# Patient Record
Sex: FEMALE | Race: WHITE | Employment: PART TIME | URBAN - METROPOLITAN AREA
[De-identification: names, ages, dates, MRNs, and addresses within clinical notes are randomized per-mention and may not be internally consistent; named-entity substitution may affect disease eponyms.]

---
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
## 2021-04-30 DIAGNOSIS — F41.9 ANXIETY: ICD-10-CM

## 2021-04-30 RX ORDER — ESCITALOPRAM OXALATE 5 MG/1
TABLET ORAL
Qty: 90 TABLET | Refills: 0 | Status: SHIPPED | OUTPATIENT
Start: 2021-04-30 | End: 2021-05-20 | Stop reason: SDUPTHER

## 2021-05-20 ENCOUNTER — OFFICE VISIT (OUTPATIENT)
Dept: FAMILY MEDICINE CLINIC | Age: 21
End: 2021-05-20
Payer: COMMERCIAL

## 2021-05-20 VITALS
BODY MASS INDEX: 21.53 KG/M2 | SYSTOLIC BLOOD PRESSURE: 112 MMHG | DIASTOLIC BLOOD PRESSURE: 74 MMHG | HEIGHT: 66 IN | HEART RATE: 87 BPM | OXYGEN SATURATION: 99 % | WEIGHT: 134 LBS

## 2021-05-20 DIAGNOSIS — Z00.00 WELL ADULT EXAM: Primary | ICD-10-CM

## 2021-05-20 DIAGNOSIS — F41.9 ANXIETY: ICD-10-CM

## 2021-05-20 PROCEDURE — 99395 PREV VISIT EST AGE 18-39: CPT | Performed by: NURSE PRACTITIONER

## 2021-05-20 RX ORDER — CHOLECALCIFEROL (VITAMIN D3) 125 MCG
500 CAPSULE ORAL DAILY
COMMUNITY

## 2021-05-20 RX ORDER — ESCITALOPRAM OXALATE 5 MG/1
TABLET ORAL
Qty: 90 TABLET | Refills: 1 | Status: SHIPPED | OUTPATIENT
Start: 2021-05-20 | End: 2021-07-02

## 2021-05-20 RX ORDER — MULTIVIT-MIN/IRON/FOLIC ACID/K 18-600-40
1 CAPSULE ORAL DAILY
COMMUNITY

## 2021-05-20 SDOH — ECONOMIC STABILITY: FOOD INSECURITY: WITHIN THE PAST 12 MONTHS, THE FOOD YOU BOUGHT JUST DIDN'T LAST AND YOU DIDN'T HAVE MONEY TO GET MORE.: NEVER TRUE

## 2021-05-20 SDOH — ECONOMIC STABILITY: FOOD INSECURITY: WITHIN THE PAST 12 MONTHS, YOU WORRIED THAT YOUR FOOD WOULD RUN OUT BEFORE YOU GOT MONEY TO BUY MORE.: NEVER TRUE

## 2021-05-20 ASSESSMENT — PATIENT HEALTH QUESTIONNAIRE - PHQ9
2. FEELING DOWN, DEPRESSED OR HOPELESS: 0
SUM OF ALL RESPONSES TO PHQ QUESTIONS 1-9: 0

## 2021-05-20 NOTE — PROGRESS NOTES
History and Physical      Meme Lock  YOB: 2000    Date of Service:  5/20/2021    Chief Complaint:   Leigh Dance is a 24 y.o. female who presents for complete physical examination. Chief Complaint   Patient presents with    Annual Exam     bruises on thigh X 2 weeks, not sure how she got them       HPI: Patient is here for her preventative physical.     Goes to college at [employer] to be a [employer] next year. Lives in an apt at school with a roommate. Has adopted a dog and serves as an emotional support animal.     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 Mood Disorder:  Patient presents for follow-up of anxiety disorder. Current complaints include: anxiety. She denies depressed mood, tearfulness, insomnia, changes in appetite/weight, difficulty concentrating, excessive worry, restlessness, panic attacks and suicidal thoughts or behavior. Symptoms/signs of joseph: none. External stressors: nothing new. Current treatment includes: Lexapro- 5 mg daily. Medication side effects: none. Reports that anxiety better this spring semester. Had more anxiety in the fall semester. Feels that school was smother. Denies depression. Feels that anxiety is controlled on the lexapro 5 mg daily. Worried if she would stop medication that her sleep would decline again. Sleeping well at night. Getting 7-8 hours per sleep. Has brusing on left thigh that has been there for a while. Dog does jump on her. Eats vegan diet. Exercise- 1-2 times per week will do dance exercises. Periods are regular every 28 days. Has some cramping and will need to take an occasional ibuprofen and that works well for her. Wt Readings from Last 3 Encounters:   05/20/21 134 lb (60.8 kg) (60 %, Z= 0.25)*   01/10/20 133 lb (60.3 kg) (64 %, Z= 0.35)*   06/06/19 127 lb (57.6 kg) (56 %, Z= 0.15)*     * Growth percentiles are based on CDC (Girls, 2-20 Years) data. BP Readings from Last 3 Encounters:   05/20/21 112/74   01/10/20 112/68   06/06/19 110/70       Patient Active Problem List   Diagnosis    Generalized hyperhidrosis- followed by tunde Anne Code:  Health Maintenance   Topic Date Due    Hepatitis C screen  Never done    COVID-19 Vaccine (1) Never done    HIV screen  Never done    Chlamydia screen  Never done    DTaP/Tdap/Td vaccine (7 - Td) 07/09/2022    Hepatitis B vaccine  Completed    Hib vaccine  Completed    HPV vaccine  Completed    Varicella vaccine  Completed    Meningococcal (ACWY) vaccine  Completed    Flu vaccine  Completed    Hepatitis A vaccine  Aged Out    Pneumococcal 0-64 years Vaccine  Aged Out        Sexual activity: none   Self-breast exams: yes  Last eye exam: 2018, normal  Exercise: aerobics 2 time(s) per week  Seatbelt use: always   Lipid panel: No results found for: CHOL, TRIG, HDL, LDLCALC, LDLDIRECT     Advance Directive: N, <no information>    Immunization History   Administered Date(s) Administered    DTaP 2001, 2001, 2001, 01/28/2004, 03/29/2006    HPV 9-valent Coralyn Landsberg) 05/02/2017, 05/31/2018, 01/10/2020    Hepatitis B 2001, 2001, 03/13/2002    Hib, unspecified 2001, 2001, 2001, 06/12/2002    Influenza Virus Vaccine 10/18/2018, 10/01/2019, 10/03/2020    Influenza, Quadv, IM, PF (6 mo and older Fluzone, Flulaval, Fluarix, and 3 yrs and older Afluria) 10/12/2019    MMR 06/12/2002, 03/29/2006    Meningococcal MCV4P (Menactra) 05/31/2018    Meningococcal MPSV4 (Menomune) 06/26/2013    Pneumococcal Conjugate 7-valent (Prevnar7) 2001, 2001, 2001    Polio IPV (IPOL) 2001, 2001, 2001, 03/29/2006    Tdap (Boostrix, Adacel) 07/09/2012    Varicella (Varivax) 06/12/2002, 06/16/2015       Allergies   Allergen Reactions    Corn-Containing Products      Vomiting     Pcn [Penicillins] Rash     Outpatient Medications Marked as Taking for the 5/20/21 encounter (Office Visit) with Sarah YaelNICOL CNP   Medication Sig Dispense Refill    escitalopram (LEXAPRO) 5 MG tablet TAKE 1 TABLET BY MOUTH ONE TIME A DAY 90 tablet 0       No past medical history on file. No past surgical history on file. Family History   Problem Relation Age of Onset    Other Mother         lupus    Osteoporosis Maternal Grandmother     Other Maternal Grandmother         depression, demetia     Other Maternal Grandfather         hypothyroid    High Blood Pressure Maternal Grandfather     Breast Cancer Paternal Grandmother     Other Paternal Grandmother         lupus    Substance Abuse Paternal Grandfather     Heart Attack Paternal Grandfather     High Cholesterol Paternal Grandfather     Prostate Cancer Paternal Grandfather      Social History     Socioeconomic History    Marital status: Single     Spouse name: Not on file    Number of children: Not on file    Years of education: Not on file    Highest education level: Not on file   Occupational History    Not on file   Tobacco Use    Smoking status: Never Smoker    Smokeless tobacco: Never Used   Vaping Use    Vaping Use: Never used   Substance and Sexual Activity    Alcohol use: No    Drug use: No    Sexual activity: Never   Other Topics Concern    Not on file   Social History Narrative    Not on file     Review of Systems:  A comprehensive review of systems was negative except for what was noted in the HPI. Physical Exam:   Vitals:    05/20/21 0829   BP: 112/74   Pulse: 87   SpO2: 99%   Weight: 134 lb (60.8 kg)   Height: 5' 6\" (1.676 m)     Body mass index is 21.63 kg/m². Constitutional: She is oriented to person, place, and time. She appears well-developed and well-nourished. No distress. HEENT:   Head: Normocephalic and atraumatic.    Right Ear: Tympanic membrane, external ear and ear canal normal.   Left Ear: Tympanic membrane, external ear and ear canal normal.   Nose: Nose normal.   Mouth/Throat: Oropharynx is clear and moist, and mucous membranes are normal.  There is no cervical adenopathy. Eyes: Conjunctivae and extraocular motions are normal. Pupils are equal, round, and reactive to light. Neck: Neck supple. No JVD present. Carotid bruit is not present. No mass and no thyromegaly present. Cardiovascular: Normal rate, regular rhythm, normal heart sounds and intact distal pulses. Exam reveals no gallop and no friction rub. No murmur heard. Pulmonary/Chest: Effort normal and breath sounds normal. No respiratory distress. She has no wheezes, rhonchi or rales. Abdominal: Soft, non-tender. Musculoskeletal: Normal range of motion, no synovitis. She exhibits no edema. Neurological: She is alert and oriented to person, place, and time. She has normal reflexes. No cranial nerve deficit. Coordination normal.   Skin: Skin is warm and dry. There is no rash or erythema. No suspicious lesions noted. Healing bruises noted on roland thighs. Psychiatric: She has a normal mood and affect. Her speech is normal and behavior is normal. Judgment, cognition and memory are normal.     Assessment/Plan:    Wilberto Liang was seen today for annual exam.    Diagnoses and all orders for this visit:    Well adult exam  -     Lipid, Fasting; Future  -     Comprehensive Metabolic Panel, Fasting; Future  -     CBC Auto Differential; Future  Healthy lifestyles reviewed: diet, aerobic exercise, sunscreen, vision and dental exams. Anxiety  -     escitalopram (LEXAPRO) 5 MG tablet; TAKE 1 TABLET BY MOUTH ONE TIME A DAY  Well controlled. She is not ready to wean off medication. Advised during the summer she can decrease dose to 2.5 mg daily and then increase back up to 5 mg three weeks before starting school.

## 2021-05-21 DIAGNOSIS — Z00.00 WELL ADULT EXAM: ICD-10-CM

## 2021-05-21 LAB
A/G RATIO: 2.3 (ref 1.1–2.2)
ALBUMIN SERPL-MCNC: 4.5 G/DL (ref 3.4–5)
ALP BLD-CCNC: 82 U/L (ref 40–129)
ALT SERPL-CCNC: 6 U/L (ref 10–40)
ANION GAP SERPL CALCULATED.3IONS-SCNC: 11 MMOL/L (ref 3–16)
AST SERPL-CCNC: 16 U/L (ref 15–37)
BASOPHILS ABSOLUTE: 0 K/UL (ref 0–0.2)
BASOPHILS RELATIVE PERCENT: 0.5 %
BILIRUB SERPL-MCNC: <0.2 MG/DL (ref 0–1)
BUN BLDV-MCNC: 13 MG/DL (ref 7–20)
CALCIUM SERPL-MCNC: 9.7 MG/DL (ref 8.3–10.6)
CHLORIDE BLD-SCNC: 107 MMOL/L (ref 99–110)
CHOLESTEROL, FASTING: 168 MG/DL (ref 0–199)
CO2: 25 MMOL/L (ref 21–32)
CREAT SERPL-MCNC: 0.6 MG/DL (ref 0.6–1.1)
EOSINOPHILS ABSOLUTE: 0.1 K/UL (ref 0–0.6)
EOSINOPHILS RELATIVE PERCENT: 2 %
GFR AFRICAN AMERICAN: >60
GFR NON-AFRICAN AMERICAN: >60
GLOBULIN: 2 G/DL
GLUCOSE FASTING: 86 MG/DL (ref 70–99)
HCT VFR BLD CALC: 43.2 % (ref 36–48)
HDLC SERPL-MCNC: 55 MG/DL (ref 40–60)
HEMOGLOBIN: 14.6 G/DL (ref 12–16)
LDL CHOLESTEROL CALCULATED: 94 MG/DL
LYMPHOCYTES ABSOLUTE: 1.5 K/UL (ref 1–5.1)
LYMPHOCYTES RELATIVE PERCENT: 34.4 %
MCH RBC QN AUTO: 28.9 PG (ref 26–34)
MCHC RBC AUTO-ENTMCNC: 33.8 G/DL (ref 31–36)
MCV RBC AUTO: 85.3 FL (ref 80–100)
MONOCYTES ABSOLUTE: 0.5 K/UL (ref 0–1.3)
MONOCYTES RELATIVE PERCENT: 12.4 %
NEUTROPHILS ABSOLUTE: 2.2 K/UL (ref 1.7–7.7)
NEUTROPHILS RELATIVE PERCENT: 50.7 %
PDW BLD-RTO: 13.1 % (ref 12.4–15.4)
PLATELET # BLD: 99 K/UL (ref 135–450)
PLATELET SLIDE REVIEW: ABNORMAL
PMV BLD AUTO: 10.4 FL (ref 5–10.5)
POTASSIUM SERPL-SCNC: 4.4 MMOL/L (ref 3.5–5.1)
RBC # BLD: 5.07 M/UL (ref 4–5.2)
SLIDE REVIEW: ABNORMAL
SODIUM BLD-SCNC: 143 MMOL/L (ref 136–145)
TOTAL PROTEIN: 6.5 G/DL (ref 6.4–8.2)
TRIGLYCERIDE, FASTING: 95 MG/DL (ref 0–150)
VLDLC SERPL CALC-MCNC: 19 MG/DL
WBC # BLD: 4.4 K/UL (ref 4–11)

## 2021-05-24 ENCOUNTER — TELEPHONE (OUTPATIENT)
Dept: FAMILY MEDICINE CLINIC | Age: 21
End: 2021-05-24

## 2021-05-24 DIAGNOSIS — D69.6 DECREASED PLATELET COUNT (HCC): Primary | ICD-10-CM

## 2021-05-24 NOTE — TELEPHONE ENCOUNTER
MOP called in stating that her daughter's lab results showed abnormal results. She saw it on my chart and would like to talk with someone about this.     Please Advise

## 2021-06-17 ENCOUNTER — TELEPHONE (OUTPATIENT)
Dept: DERMATOLOGY | Age: 21
End: 2021-06-17

## 2021-06-17 NOTE — TELEPHONE ENCOUNTER
Patient is requesting a return call to schedule an appt for mole check rt breast may be growing and lightening. Please advise. Thank you! Ivory Jeong

## 2021-06-29 ENCOUNTER — TELEPHONE (OUTPATIENT)
Dept: FAMILY MEDICINE CLINIC | Age: 21
End: 2021-06-29

## 2021-06-29 NOTE — TELEPHONE ENCOUNTER
I spoke to patient and she is not having eye pain. She feels it is just anxiety.  She will restart the Lexapro 10 mg. Scheduled for VV on Friday @3 pmsince she has to work Thursday afternoon

## 2021-06-29 NOTE — TELEPHONE ENCOUNTER
----- Message from Soila Yoon sent at 6/29/2021  2:54 PM EDT -----  Subject: Appointment Request    Reason for Call: Urgent (Patient Request) No Script    QUESTIONS  Type of Appointment? Established Patient  Reason for appointment request? Available appointments did not meet   patient need  Additional Information for Provider? Having obsessive thoughts about   blinking her eyes It is disruptive to her day. would like to be see today   or tomorrow due to college schedule   ---------------------------------------------------------------------------  --------------  CALL BACK INFO  What is the best way for the office to contact you? OK to leave message on   [employer], OK to respond with electronic message via [employer] portal (only   for patients who have registered [employer] account)  Preferred Call Back Phone Number? [phone]  ---------------------------------------------------------------------------  --------------  SCRIPT ANSWERS  Relationship to Patient? Self  Appointment reason? Symptomatic  Select script based on patient symptoms? Adult No Script  (Is the patient requesting to see the provider for a procedure?)? No  (Is the patient requesting to see the provider urgently  today or   tomorrow. )? Yes  Have you been diagnosed with, awaiting test results for, or told that you   are suspected of having COVID-19 (Coronavirus)? (If patient has tested   negative or was tested as a requirement for work, school, or travel and   not based on symptoms, answer no)? No  Do you currently have flu-like symptoms including fever or chills, cough,   shortness of breath, difficulty breathing, or new loss of taste or smell? No  Have you had close contact with someone with COVID-19 in the last 14 days? No  (Service Expert  click yes below to proceed with Stuffle As Usual   Scheduling)?  Yes

## 2021-06-29 NOTE — TELEPHONE ENCOUNTER
She can schedule a VV with me on Thursday. Please make sure she is not having eye pain or decreased vision. If that is the case, she would need to come in for an appt today. For her anxiety she can increase her lexapro back to 10 mg daily. She can take two of her 5 mg tabs. I can send her in the higher dose tab after she has her VV with me.

## 2021-06-29 NOTE — TELEPHONE ENCOUNTER
Called pt. Pt stated she is having anxiety constantly thinking for the last 5 days. Stated she thought her eyes were dry and so she put drops in the but it has not seemed to help. Stated she has not been stressed or anxiety over other things lately but thinks it is in her head. Stated she is finding herself more anxious after lowering the dose of anxiety medication Lexapro 5 mg only talking half a 5 mg tablet per day. Stated she is wondering if she needs to switch back or what her options are. Stated she has a head ache today but not noticed consistently having one. Josse Mckeon doesn't have anything today and on Admin day tomorrow. Pt is going back to [place] for school tomorrow afternoon but is okay with a VV. Pt can be reached anytime at [phone].

## 2021-06-30 ENCOUNTER — NURSE TRIAGE (OUTPATIENT)
Dept: OTHER | Facility: CLINIC | Age: 21
End: 2021-06-30

## 2021-07-01 NOTE — TELEPHONE ENCOUNTER
Reason for Disposition   [1] Drinking very little AND [2] dehydration suspected (e.g., no urine > 12 hours, very dry mouth, very lightheaded)    Answer Assessment - Initial Assessment Questions  1. VOMITING SEVERITY: \"How many times have you vomited in the past 24 hours? \"      - MILD:  1 - 2 times/day     - MODERATE: 3 - 5 times/day, decreased oral intake without significant weight loss or symptoms of dehydration     - SEVERE: 6 or more times/day, vomits everything or nearly everything, with significant weight loss, symptoms of dehydration       More than 6+ times    2. ONSET: \"When did the vomiting begin? \"       Tonight    3. FLUIDS: \"What fluids or food have you vomited up today? \" \"Have you been able to keep any fluids down? \"     Patient reports she has vomited all fluids and foods she has ate today. Patient reports she has attempted water and Powerade but both came back up. 4. ABDOMINAL PAIN: \"Are your having any abdominal pain? \" If yes : \"How bad is it and what does it feel like? \" (e.g., crampy, dull, intermittent, constant)       Denies pain    5. DIARRHEA: \"Is there any diarrhea? \" If so, ask: \"How many times today? \"      Yes, patient reports x1 episode of diarrhea    6. CONTACTS: \"Is there anyone else in the family with the same symptoms? \"       Denies sick contacts    7. CAUSE: \"What do you think is causing your vomiting? \"      Possibly food poisoning- Patient reports she ate food that had been left out for a long time    8. HYDRATION STATUS: \"Any signs of dehydration? \" (e.g., dry mouth [not only dry lips], too weak to stand) \"When did you last urinate? \"      Patient reports she feels too weak to stand and lightheaded. Last urinated 3 hours ago    9. OTHER SYMPTOMS: \"Do you have any other symptoms? \" (e.g., fever, headache, vertigo, vomiting blood or coffee grounds, recent head injury)   Patient denies any further  symptoms    10. PREGNANCY: \"Is there any chance you are pregnant? \" \"When was your last

## 2021-07-02 ENCOUNTER — VIRTUAL VISIT (OUTPATIENT)
Dept: FAMILY MEDICINE CLINIC | Age: 21
End: 2021-07-02
Payer: COMMERCIAL

## 2021-07-02 DIAGNOSIS — D69.6 THROMBOCYTOPENIA (HCC): ICD-10-CM

## 2021-07-02 DIAGNOSIS — R11.2 NAUSEA AND VOMITING IN ADULT: ICD-10-CM

## 2021-07-02 DIAGNOSIS — F41.9 ANXIETY: ICD-10-CM

## 2021-07-02 PROCEDURE — 99214 OFFICE O/P EST MOD 30 MIN: CPT | Performed by: NURSE PRACTITIONER

## 2021-07-02 RX ORDER — ESCITALOPRAM OXALATE 10 MG/1
TABLET ORAL
Qty: 90 TABLET | Refills: 1 | Status: SHIPPED | OUTPATIENT
Start: 2021-07-02 | End: 2022-07-07 | Stop reason: SDUPTHER

## 2021-07-02 ASSESSMENT — ENCOUNTER SYMPTOMS
COUGH: 0
NAUSEA: 1
VOMITING: 0
ABDOMINAL PAIN: 0
SHORTNESS OF BREATH: 0
DIARRHEA: 0

## 2021-07-02 NOTE — PROGRESS NOTES
2021    TELEHEALTH EVALUATION -- Audio/Visual (During [id number]-96 public health emergency)    HPI:    Concepcion Hall (:  2000) has requested an audio/video evaluation for the following concern(s):  Chief Complaint   Patient presents with    Anxiety     Patient reports at her last visit felt like her anxiety was getting better. Her dose was decreased in May 2021. After that she had to make appt to see specialists and anxiety has increased. A week ago felt her eyes were dry. She has been over thinking about blinking. Denies eye pain or discharge. Has tried OTC eye lubrication without much improvement in symptoms. Seen hematologist Dr. Lila Noel for low platelets. Advised to monitor. Planning on seeing pediatric surgery for evaluation of chest wall. Reports that she has chest pains every other day. Never has to take medication for it. Denies shortness of breath or dizziness. Unknown if pain is there when she is having increased anxiety. Planning on seeing dermatologist Dr. Liam Tomlinson for skin check. Has a mole on her right nipple that she wants evaluated. Mother thinks it has enlarged. Was driving back to [place] on Wed and got nausea and vomiting. Went to ER in [place] and given zofran. She is now back in [place]. She has not had vomiting yesterday or today. She is staying hydrated with water. She is having more fatigue. Is feeling better. Review of Systems   Constitutional: Positive for fatigue. Negative for activity change. Respiratory: Negative for cough and shortness of breath. Cardiovascular: Positive for chest pain. Negative for palpitations. Gastrointestinal: Positive for nausea. Negative for abdominal pain, diarrhea and vomiting. Neurological: Negative for dizziness and headaches. Psychiatric/Behavioral: Negative for dysphoric mood and suicidal ideas. The patient is nervous/anxious. Prior to Visit Medications    Medication Sig Taking?  Authorizing Provider Cholecalciferol (VITAMIN D) 50 MCG (2000 UT) CAPS capsule Take 1 capsule by mouth daily Yes Historical Provider, MD   vitamin B-12 (CYANOCOBALAMIN) 500 MCG tablet Take 500 mcg by mouth daily Yes Historical Provider, MD   escitalopram (LEXAPRO) 5 MG tablet TAKE 1 TABLET BY MOUTH ONE TIME A DAY Yes [facility], APRN - [facility]   aluminum chloride (DRYSOL) 20 % external solution APPLY TO THE HANDS AND FEET NIGHTLY  Patient not taking: Reported on 11/16/2020  Thomas Camarena MD       Social History     Tobacco Use    Smoking status: Never Smoker    Smokeless tobacco: Never Used   Vaping Use    Vaping Use: Never used   Substance Use Topics    Alcohol use: No    Drug use: No      PHYSICAL EXAMINATION:    Patient-Reported Vitals 7/2/2021   Patient-Reported Weight 130#   Patient-Reported Height -      Constitutional: [x] Appears well-developed and well-nourished [x] No apparent distress      [] Abnormal-   Mental status  [x] Alert and awake  [x] Oriented to person/place/time [x]Able to follow commands      Eyes:  EOM    [x]  Normal  [] Abnormal-  Sclera  [x]  Normal  [] Abnormal -         Discharge [x]  None visible  [] Abnormal -    HENT:   [x] Normocephalic, atraumatic.   [] Abnormal   [x] Mouth/Throat: Mucous membranes are moist.     External Ears [x] Normal  [] Abnormal-     Neck: [x] No visualized mass     Pulmonary/Chest: [x] Respiratory effort normal.  [x] No visualized signs of difficulty breathing or respiratory distress        [] Abnormal-      Musculoskeletal:   [x] Normal gait with no signs of ataxia         [x] Normal range of motion of neck        [] Abnormal-       Neurological:        [x] No Facial Asymmetry (Cranial nerve 7 motor function) (limited exam to video visit)          [x] No gaze palsy        [] Abnormal-         Skin:        [x] No significant exanthematous lesions or discoloration noted on facial skin         [] Abnormal-            Psychiatric:       [x] Normal Affect [x] No Hallucinations        [] Abnormal-     Other pertinent observable physical exam findings-     ASSESSMENT/PLAN:  1. Anxiety  Increase back to 10 mg dose. - escitalopram (LEXAPRO) 10 MG tablet; TAKE 1 TABLET BY MOUTH ONE TIME A DAY  Dispense: 90 tablet; Refill: 1  Advised to start counseling. She will let me know if her anxiety does not improve back on increased dose of medication. 2. Nausea and vomiting in adult  Improved from recent ER visit. Advised to stay hydrated with water. She has zofran that she can take PRN. Advised bland diet till symptoms resolve. 3. Thrombocytopenia (HCC)  Stable. Had evaluation with hematologist Dr. Nisha Mora. Monitoring. Return in about 5 months (around 11/22/2021), or if symptoms worsen or fail to improve, for mood. Kassi Lynch, was evaluated through a synchronous (real-time) audio-video encounter. The patient (or guardian if applicable) is aware that this is a billable service. Verbal consent to proceed has been obtained within the past 12 months. The visit was conducted pursuant to the emergency declaration under the [address] [place] [address], [address] [place] [address] authority and the [employer] General Act. Patient identification was verified, and a caregiver was present when appropriate. The patient was located in a state where the provider was credentialed to provide care. Total time spent on this encounter: Alexa Cabrales, NICOL - CNP on 7/2/2021 at 3:15 PM    An electronic signature was used to authenticate this note.

## 2021-08-02 ENCOUNTER — OFFICE VISIT (OUTPATIENT)
Dept: DERMATOLOGY | Age: 21
End: 2021-08-02
Payer: COMMERCIAL

## 2021-08-02 VITALS — TEMPERATURE: 97.7 F

## 2021-08-02 DIAGNOSIS — L70.9 ADULT ACNE: ICD-10-CM

## 2021-08-02 DIAGNOSIS — L74.519 PRIMARY FOCAL HYPERHIDROSIS: Primary | ICD-10-CM

## 2021-08-02 DIAGNOSIS — D22.5 NEVUS OF FEMALE BREAST: ICD-10-CM

## 2021-08-02 DIAGNOSIS — D23.9 DERMATOFIBROMA: ICD-10-CM

## 2021-08-02 PROCEDURE — 99213 OFFICE O/P EST LOW 20 MIN: CPT | Performed by: DERMATOLOGY

## 2021-08-02 NOTE — PROGRESS NOTES
[facility]  Miguelito Cornell MD  [phone]      Concepcion Hall  2000    22 y.o. female     Date of Visit: 8/2/2021    Chief Complaint: sweating, moles, acne    History of Present Illness:    1. She returns today to follow-up for history of primary focal hyperhidrosis of the palms, soles and axillae. It has been present since age 10. Remains about the same since last visit. She is currently not treating it with any prescription medications. Clinical strength over-the-counter antiperspirants seem to help the axilla. The palms are most bothersome. Hx:  Present since the age of 10  Had Botox of the hands and feet in February 2018 without improvement. Robinul reportedly not helpful. Drysol - causes burning and contact dermatitis in the axillae. Did iontophoresis with some improvement. 2.  She reports a longstanding mole on the right areola. 3.  She also complains of a couple of asymptomatic lesions on the left elbow and right leg. 4.  She also complains of mild adult acne. Review of Systems:  Gen: Feels well, good sense of health. Past Medical History, Family History, Surgical History, Medications and Allergies reviewed. History reviewed. No pertinent past medical history. History reviewed. No pertinent surgical history.     Allergies   Allergen Reactions    Corn-Containing Products      Vomiting     Pcn [Penicillins] Rash     Outpatient Medications Marked as Taking for the 8/2/21 encounter (Office Visit) with Alejandra Mena MD   Medication Sig Dispense Refill    aluminum chloride (DRYSOL) 20 % external solution APPLY TO THE HANDS AND FEET NIGHTLY 60 mL 2    escitalopram (LEXAPRO) 10 MG tablet TAKE 1 TABLET BY MOUTH ONE TIME A DAY 90 tablet 1    Cholecalciferol (VITAMIN D) 50 MCG (2000 UT) CAPS capsule Take 1 capsule by mouth daily      vitamin B-12 (CYANOCOBALAMIN) 500 MCG tablet Take 500 mcg by mouth daily         Physical Examination       The following were examined and determined to be normal: Psych/Neuro, Scalp/hair, Conjunctivae/eyelids, Gums/teeth/lips, Neck, Breast/axilla/chest, Abdomen, Back, RLE and LLE (feet not examined). The following were examined and determined to be abnormal: Head/face, RUE and LUE. Well-appearing. 1.  Palms visibly sweating. Axilla overall unremarkable today. 2.  Right areola with a well-defined round smooth brown papule. 3.  Left elbow and right medial leg with 2 round indurated reddish-brown papules. 4.  Forehead with several erythematous papules and pustules. Assessment and Plan     1. Primary focal hyperhidrosis - axillae, palms and soles    Start Drysol nightly to the hands and feet. We also discussed resuming iontophoresis but she declined for now because it is too time consuming. Samples provided for Qbrexza wipes for axilla. 2. Nevus of female breast - benign appearing    Reassurance. 3. Dermatofibromas    Reassurance. 4. Adult acne - mild    Differin 0.1% gel nightly.           --Alicia Topete MD

## 2021-08-19 ENCOUNTER — PATIENT MESSAGE (OUTPATIENT)
Dept: DERMATOLOGY | Age: 21
End: 2021-08-19

## 2021-08-20 NOTE — TELEPHONE ENCOUNTER
Called and spoke to [facility] and confirmed that it should be 1 carton (contains 30 wipes) and that it should last patient 30 days.

## 2021-08-20 NOTE — TELEPHONE ENCOUNTER
Dr Michael Campbell patient   Ismaelluca Clari from [facility] called stating needs to verify day supply for Qbrexa.   Please c/b to discuss

## 2021-08-24 ENCOUNTER — TELEPHONE (OUTPATIENT)
Dept: DERMATOLOGY | Age: 21
End: 2021-08-24

## 2021-08-24 NOTE — LETTER
[facility]  [address] [facility] [facility]  Phone: [phone]  Fax: [phone]    Precious Merritt MD     Re: Lance Hidalgo, 2000 August 30, 2021      To Whom It May Concern:    Lino Copeland is under my care for a diagnosis of primary focal hyperhidrosis (L54.510) of the axilla, palms and soles. Her palms and soles are currently being treated with Drysol solution. However axillary involvement persists and is socially distressing. Symptoms are present daily and always interfere with interpersonal activities and understandably negatively affects her quality of life. Treatment history:  Present since the age of 10  Had Botox of the hands and feet in February 2018 without improvement. Robinul not effective. Drysol - causes burning and contact dermatitis in the axillae. Did iontophoresis with some improvement. Jeanie Emms is a newer topical anticholinergic medicine that has been shown to be very effective for patients with primary axillary hyperhidrosis. This represents a good alternative for her given her intolerance of Drysol for axillary involvement. Please reconsider this request for approval for Jeanie Emms for this unfortunate patient.        Sincerely,        Precious Merritt MD

## 2021-08-25 NOTE — TELEPHONE ENCOUNTER
Friend's not covered. [address] [facility] [place] 30 cloths $550.00. No goodrx coupon. No samples.

## 2021-08-25 NOTE — TELEPHONE ENCOUNTER
Pt mother Irving Torres calling wants to kow if a second appeal for medication Romel Huffporshasolitario be sent for her daughter looks like first time it has been denied pls return patient mother call back @ [phone] to discuss

## 2021-08-25 NOTE — TELEPHONE ENCOUNTER
Cost [facility] Any coupon from rep?

## 2021-08-31 NOTE — TELEPHONE ENCOUNTER
Faxed letter to [address] appeals. Informed patient's mother that appeal was submitted. She verbalized understanding.

## 2021-09-08 NOTE — TELEPHONE ENCOUNTER
[employer] requesting additonal information for appeal.     Completed form and placed on your desk to sign.

## 2021-09-21 ENCOUNTER — CARE COORDINATION (OUTPATIENT)
Dept: OTHER | Facility: CLINIC | Age: 21
End: 2021-09-21

## 2021-09-21 NOTE — CARE COORDINATION
Ambulatory Care Coordination Note  9/21/2021  CM Risk Score: 0  Charlson 10 Year Mortality Risk Score: 2%     ACC: Nica Rios RN    Summary Note: Associate Care Manager (ACM) spoke to patient's mother, Hillary Horton via phone regarding patient's brother. During the call, Hillary Horton mentioned concerns regarding patient having difficulty getting a medication approved through MMO for wipes related to diagnosis of hyperhidrosis. States MMO does not recognize hyperhidrosis as a medical diagnosis. Angle requested ACM assist with getting Glycopyrronium Tosylate pads approved. ACM sending referral to [address] [place] [address]  for assistance. Future Appointments   Date Time Provider Aries Keith   11/22/2021  1:00 PM [facility]:  -ACM to send referral to Amilcar Beck, [address] [place] [address]  for assistance getting  Glycopyrronium Tosylate pads covered with MMO (done- referral sent 9/23/2021)    -ACM to follow up with patient or mother in ~2 weeks. Nica Rios RN BSN  Associate Care Manager  Phone: [phone]  Email: [email or web address]

## 2021-09-23 ENCOUNTER — CARE COORDINATION (OUTPATIENT)
Dept: OTHER | Facility: CLINIC | Age: 21
End: 2021-09-23

## 2021-09-23 NOTE — CARE COORDINATION
Care Coordination  (CCSS) received referral from LPN/RN/SW requesting assistance with Assistance with benefits related needs (network exception process, prior authorization, ect.) yes - assistance getting  Glycopyrronium Tosylate pads covered for diagnosis of hyperhidrosis. Summary Note:     Contacted [employer] - per Roshanda  Glycopyrronium Tosylate pads are excluded from members plan. States prior Candelario Mews is needed from prescribing provider or pharmacy. [address]- spoke with  Saira Yoo who confirmed Glycopyrronium Tosylate pads was not covered by ins therefore order was cancelled. Explained that a prior Candelario Mews is needed for approval per [employer]. Saira Yoo stated he would  submit a prior auth for the Glycopyrronium Tosylate pads to insurance. CCSS will follow back up with [facility] on status of auth/ approval.         Plan:              Chart routed to LPN/RN/MATA for review.

## 2021-09-27 ENCOUNTER — CARE COORDINATION (OUTPATIENT)
Dept: OTHER | Facility: CLINIC | Age: 21
End: 2021-09-27

## 2021-09-27 NOTE — TELEPHONE ENCOUNTER
Received fax from [facility] benefits with appeal denial for Qbrexza. Insurance is recommending Xerax AC 6.25%. Please advise.

## 2021-09-27 NOTE — CARE COORDINATION
CCSS contacted [facility] to check status of prior auth for Glycopyrronium Tosylate pads- per Fernando Carbajal is still pending. States once [place] is processed they will get in contact with the patient.

## 2021-09-28 NOTE — TELEPHONE ENCOUNTER
We could try their alternative Cornelia Boom to see if it would not be as irritating as Drysol was in the past (for her armpits). If she's willing to try this for a few weeks, let's do that so that if it does not work, we'll have more reason for them to approve the Qbrexza wipes.

## 2021-09-28 NOTE — TELEPHONE ENCOUNTER
Received call from Bess from [facility] and said Dr An Tilley could file a 2nd level appeal and fax it to [phone]. They are requesting responses to numbers 2 and 3 on appeal letter and chart notes. Appeal letter and chart notes placed on your desk to review/respond to. Please advise.

## 2021-09-29 NOTE — TELEPHONE ENCOUNTER
Left detailed message informing patient's mother of Dr Atul Sexton message. Asked for her to call back.

## 2021-09-30 ENCOUNTER — TELEPHONE (OUTPATIENT)
Dept: DERMATOLOGY | Age: 21
End: 2021-09-30

## 2021-09-30 RX ORDER — XERAC AC 0.06 G/G
LIQUID TOPICAL
Qty: 60 ML | Refills: 3 | Status: SHIPPED | OUTPATIENT
Start: 2021-09-30 | End: 2022-04-18 | Stop reason: ALTCHOICE

## 2021-09-30 NOTE — TELEPHONE ENCOUNTER
Copied from duplicate encounter. Cherelle Maddox routed conversation to [employer] [facility] Clinical Staff Pool 2 minutes ago (1:47 PM)   Cherelle Maddox 2 minutes ago (1:46 PM)   AW     Dr Salina Sierra patient  Pt mom c/b #[phone]  Pt mom stated  Returning a call regarding patient medication. Pt mom says dr Salina Sierra wanted to switch to a lower strength for [facility] which is fine. Preferred pharmacy [facility] mail [facility] pharmacy.

## 2021-09-30 NOTE — TELEPHONE ENCOUNTER
Dr Mylene Glover patient  Pt mom c/b #[phone]  Pt mom stated  Returning a call regarding patient medication. Pt mom says dr Mylene Glover wanted to switch to a lower strength for [facility] which is fine. Preferred pharmacy is [facility] mail order pharmacy.

## 2021-10-01 ENCOUNTER — CARE COORDINATION (OUTPATIENT)
Dept: OTHER | Facility: CLINIC | Age: 21
End: 2021-10-01

## 2021-10-01 NOTE — CARE COORDINATION
CCSS contacted [facility] to check status of prior auth for Glycopyrronium Tosylate pads- per Chano Sylvia was denied 9/30. States he followed up with patient & was informed  they will no longer being following up with provider regarding  the Glycopyrronium Tosylate pads.

## 2021-10-05 ENCOUNTER — CARE COORDINATION (OUTPATIENT)
Dept: OTHER | Facility: CLINIC | Age: 21
End: 2021-10-05

## 2021-10-05 NOTE — CARE COORDINATION
Associate Care Manager [facility]) called patient's mother, Fabio Coon for CC follow up. No answer; ACM left HIPAA compliant voicemail with request for return call. ACM will continue to follow. Luis Manuel Aguero RN BSN  Associate Care Manager  Phone: [phone]  Email: [email or web address]

## 2021-10-26 ENCOUNTER — CARE COORDINATION (OUTPATIENT)
Dept: OTHER | Facility: CLINIC | Age: 21
End: 2021-10-26

## 2021-10-26 NOTE — CARE COORDINATION
- DYD     Plan:  -ACM signing off due to no further needs. Roel Smith RN BSN  Associate Care Manager  Phone: [phone]  Email: [email or web address]

## 2021-12-27 ENCOUNTER — PATIENT MESSAGE (OUTPATIENT)
Dept: FAMILY MEDICINE CLINIC | Age: 21
End: 2021-12-27

## 2021-12-27 DIAGNOSIS — Z20.822 EXPOSURE TO COVID-19 VIRUS: Primary | ICD-10-CM

## 2021-12-28 DIAGNOSIS — Z20.822 EXPOSURE TO COVID-19 VIRUS: ICD-10-CM

## 2021-12-29 LAB — SARS-COV-2: NOT DETECTED

## 2022-04-18 ENCOUNTER — TELEMEDICINE (OUTPATIENT)
Dept: FAMILY MEDICINE CLINIC | Age: 22
End: 2022-04-18
Payer: COMMERCIAL

## 2022-04-18 DIAGNOSIS — J06.9 VIRAL URI WITH COUGH: Primary | ICD-10-CM

## 2022-04-18 PROCEDURE — 99213 OFFICE O/P EST LOW 20 MIN: CPT | Performed by: NURSE PRACTITIONER

## 2022-04-18 ASSESSMENT — PATIENT HEALTH QUESTIONNAIRE - PHQ9
10. IF YOU CHECKED OFF ANY PROBLEMS, HOW DIFFICULT HAVE THESE PROBLEMS MADE IT FOR YOU TO DO YOUR WORK, TAKE CARE OF THINGS AT HOME, OR GET ALONG WITH OTHER PEOPLE: 0
SUM OF ALL RESPONSES TO PHQ9 QUESTIONS 1 & 2: 0
7. TROUBLE CONCENTRATING ON THINGS, SUCH AS READING THE NEWSPAPER OR WATCHING TELEVISION: 0
8. MOVING OR SPEAKING SO SLOWLY THAT OTHER PEOPLE COULD HAVE NOTICED. OR THE OPPOSITE, BEING SO FIGETY OR RESTLESS THAT YOU HAVE BEEN MOVING AROUND A LOT MORE THAN USUAL: 0
6. FEELING BAD ABOUT YOURSELF - OR THAT YOU ARE A FAILURE OR HAVE LET YOURSELF OR YOUR FAMILY DOWN: 0
9. THOUGHTS THAT YOU WOULD BE BETTER OFF DEAD, OR OF HURTING YOURSELF: 0
5. POOR APPETITE OR OVEREATING: 0
SUM OF ALL RESPONSES TO PHQ QUESTIONS 1-9: 1
2. FEELING DOWN, DEPRESSED OR HOPELESS: 0
3. TROUBLE FALLING OR STAYING ASLEEP: 1
SUM OF ALL RESPONSES TO PHQ QUESTIONS 1-9: 1
1. LITTLE INTEREST OR PLEASURE IN DOING THINGS: 0
4. FEELING TIRED OR HAVING LITTLE ENERGY: 0
SUM OF ALL RESPONSES TO PHQ QUESTIONS 1-9: 1
SUM OF ALL RESPONSES TO PHQ QUESTIONS 1-9: 1

## 2022-04-18 ASSESSMENT — ENCOUNTER SYMPTOMS
SHORTNESS OF BREATH: 0
COUGH: 1
WHEEZING: 0
RHINORRHEA: 1
SINUS PRESSURE: 1
ABDOMINAL PAIN: 0
SORE THROAT: 0

## 2022-04-18 NOTE — PROGRESS NOTES
2022    TELEHEALTH EVALUATION -- Audio/Visual (During FJIZX-41 public health emergency)    HPI:    Echo Guy (:  2000) has requested an audio/video evaluation for the following concern(s):  Chief Complaint   Patient presents with    Fever     nausea, sore throat, runny nose, body aches. off and on for 3 weeks. Patient reports that 3 weeks ago had a fever, body aches, chills, headache, sore throat. Fever lasted for 2 days. Other symptoms lasted for 5 days. Felt completely better. A few days after she developed cold symptoms- rhinorrhea and cough. This past Sat  started with chills and temp of 101.8. Took tylenol for her fever and it has resolved. Reports that she has some body aches today. Does not have a sore throat or nausea. Continues with clear rhinorrhea and cough. Reports that she is feeling better. Has taken COVID-19 testing and it has been negative. Last was on . Reports that she has been more stressed with school as final exams are approaching. Review of Systems   Constitutional: Positive for activity change, chills, fatigue and fever. HENT: Positive for congestion, postnasal drip, rhinorrhea and sinus pressure. Negative for sore throat. Respiratory: Positive for cough. Negative for shortness of breath and wheezing. Cardiovascular: Negative for chest pain. Gastrointestinal: Negative for abdominal pain. Neurological: Positive for headaches. Negative for dizziness. Prior to Visit Medications    Medication Sig Taking?  Authorizing Provider   aluminum chloride (DRYSOL) 20 % external solution APPLY TO THE HANDS AND FEET NIGHTLY Yes Diane Conley MD   escitalopram (LEXAPRO) 10 MG tablet TAKE 1 TABLET BY MOUTH ONE TIME A DAY Yes NICOL Vasquez - CNP   Cholecalciferol (VITAMIN D) 50 MCG ( UT) CAPS capsule Take 1 capsule by mouth daily Yes Historical Provider, MD   vitamin B-12 (CYANOCOBALAMIN) 500 MCG tablet Take 500 mcg by mouth daily Yes Historical Provider, MD       Social History     Tobacco Use    Smoking status: Never Smoker    Smokeless tobacco: Never Used   Vaping Use    Vaping Use: Never used   Substance Use Topics    Alcohol use: No    Drug use: No        PHYSICAL EXAMINATION:  [ INSTRUCTIONS:  \"[x]\" Indicates a positive item  \"[]\" Indicates a negative item  -- DELETE ALL ITEMS NOT EXAMINED]  Patient-Reported Vitals 4/18/2022   Patient-Reported Weight -   Patient-Reported Height -   Patient-Reported Temperature 99.1        Constitutional: [x] Appears well-developed and well-nourished [x] No apparent distress      [] Abnormal-   Mental status  [x] Alert and awake  [x] Oriented to person/place/time [x]Able to follow commands      Eyes:  EOM    [x]  Normal  [] Abnormal-  Sclera  [x]  Normal  [] Abnormal -         Discharge [x]  None visible  [] Abnormal -    HENT:   [x] Normocephalic, atraumatic. [] Abnormal   [] Mouth/Throat: Mucous membranes are moist.     External Ears [x] Normal  [] Abnormal-     Neck: [x] No visualized mass     Pulmonary/Chest: [x] Respiratory effort normal.  [x] No visualized signs of difficulty breathing or respiratory distress        [] Abnormal-      Musculoskeletal:   [] Normal gait with no signs of ataxia         [x] Normal range of motion of neck        [] Abnormal-       Neurological:        [x] No Facial Asymmetry (Cranial nerve 7 motor function) (limited exam to video visit)          [x] No gaze palsy        [] Abnormal-         Skin:        [x] No significant exanthematous lesions or discoloration noted on facial skin         [] Abnormal-            Psychiatric:       [x] Normal Affect [x] No Hallucinations        [] Abnormal-     Other pertinent observable physical exam findings-     ASSESSMENT/PLAN:  1. Viral URI with cough  Symptoms are improving. Likely had two different viral infections.    Advised to rest and hydrate with water  Flonase 2 sprays each nostril daily and normal saline spray PRN  Delsym PRN cough  Patient is to call if symptoms worsen or fail to continue to improve within the week. Return if symptoms worsen or fail to improve. Kurt Hanna, was evaluated through a synchronous (real-time) audio-video encounter. The patient (or guardian if applicable) is aware that this is a billable service, which includes applicable co-pays. This Virtual Visit was conducted with patient's (and/or legal guardian's) consent. The visit was conducted pursuant to the emergency declaration under the [address] [place] [address], [address] [place] [address] authority and the [employer] General Act. Patient identification was verified, and a caregiver was present when appropriate. The patient was located at home in a state where the provider was licensed to provide care. Total time spent on this encounter: 21 min    --NICOL Armenta CNP on 4/18/2022 at 2:18 PM    An electronic signature was used to authenticate this note.

## 2022-04-27 ENCOUNTER — TELEPHONE (OUTPATIENT)
Dept: FAMILY MEDICINE CLINIC | Age: 22
End: 2022-04-27

## 2022-04-27 NOTE — TELEPHONE ENCOUNTER
Patient mom calling patient has been having a sore throat off and on about a month. Fever off and on the past month. No fever today  Patient takes allergy medication, tylenol seems to help symptoms come right back. Patient had vv with reva 4/18/22    Patient will be in town on Friday patient mom would like to have appt scheduled for Friday ? Red visit in office on Friday ?    Please advise

## 2022-07-07 DIAGNOSIS — F41.9 ANXIETY: ICD-10-CM

## 2022-07-07 RX ORDER — ESCITALOPRAM OXALATE 10 MG/1
TABLET ORAL
Qty: 90 TABLET | Refills: 0 | Status: SHIPPED | OUTPATIENT
Start: 2022-07-07 | End: 2022-11-03

## 2022-07-07 RX ORDER — ESCITALOPRAM OXALATE 10 MG/1
TABLET ORAL
Qty: 90 TABLET | Refills: 0 | OUTPATIENT
Start: 2022-07-07

## 2022-08-22 ENCOUNTER — TELEMEDICINE (OUTPATIENT)
Dept: FAMILY MEDICINE CLINIC | Age: 22
End: 2022-08-22
Payer: COMMERCIAL

## 2022-08-22 DIAGNOSIS — F41.9 ANXIETY: Primary | ICD-10-CM

## 2022-08-22 PROCEDURE — 99213 OFFICE O/P EST LOW 20 MIN: CPT | Performed by: NURSE PRACTITIONER

## 2022-08-22 SDOH — ECONOMIC STABILITY: FOOD INSECURITY: WITHIN THE PAST 12 MONTHS, YOU WORRIED THAT YOUR FOOD WOULD RUN OUT BEFORE YOU GOT MONEY TO BUY MORE.: NEVER TRUE

## 2022-08-22 SDOH — ECONOMIC STABILITY: FOOD INSECURITY: WITHIN THE PAST 12 MONTHS, THE FOOD YOU BOUGHT JUST DIDN'T LAST AND YOU DIDN'T HAVE MONEY TO GET MORE.: NEVER TRUE

## 2022-08-22 ASSESSMENT — ENCOUNTER SYMPTOMS
ABDOMINAL PAIN: 0
SHORTNESS OF BREATH: 0
COUGH: 0

## 2022-08-22 ASSESSMENT — SOCIAL DETERMINANTS OF HEALTH (SDOH): HOW HARD IS IT FOR YOU TO PAY FOR THE VERY BASICS LIKE FOOD, HOUSING, MEDICAL CARE, AND HEATING?: SOMEWHAT HARD

## 2022-08-22 NOTE — PROGRESS NOTES
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 Anxiety- over the summer missed some doses and that made a difference in her anxiety. Now that she is back at school she is taking daily. Gets anxiety with her busy schedule. Feels good currently     Diet- eating a healthy diet   Exercise- walking daily     Living with boyfriend. That is going well. Has a dog who also has anxiety, but dog is doing better on anxiety medication. Review of Systems   Constitutional:  Negative for activity change and fever. Respiratory:  Negative for cough and shortness of breath. Cardiovascular:  Negative for chest pain. Gastrointestinal:  Negative for abdominal pain. Neurological:  Negative for dizziness and headaches. Psychiatric/Behavioral:  Negative for dysphoric mood, sleep disturbance and suicidal ideas. The patient is nervous/anxious. Prior to Visit Medications    Medication Sig Taking? Authorizing Provider   escitalopram (LEXAPRO) 10 MG tablet TAKE 1 TABLET BY MOUTH ONE TIME A DAY Yes Hillary Miss, APRN - CNP   Cholecalciferol (VITAMIN D) 50 MCG ( UT) CAPS capsule Take 1 capsule by mouth daily Yes Historical Provider, MD   vitamin B-12 (CYANOCOBALAMIN) 500 MCG tablet Take 500 mcg by mouth daily Yes Historical Provider, MD   aluminum chloride (DRYSOL) 20 % external solution APPLY TO THE HANDS AND FEET NIGHTLY  Patient not taking: Reported on 2022  Raudel Ramos MD       Social History     Tobacco Use    Smoking status: Never    Smokeless tobacco: Never   Vaping Use    Vaping Use: Never used   Substance Use Topics    Alcohol use: No    Drug use:  No PHYSICAL EXAMINATION:  [ INSTRUCTIONS:  \"[x]\" Indicates a positive item  \"[]\" Indicates a negative item  -- DELETE ALL ITEMS NOT EXAMINED]  Patient-Reported Vitals 8/22/2022   Patient-Reported Weight 130 lb   Patient-Reported Height 5 7   Patient-Reported Temperature -        Constitutional: [x] Appears well-developed and well-nourished [x] No apparent distress      [] Abnormal-   Mental status  [x] Alert and awake  [x] Oriented to person/place/time [x]Able to follow commands      Eyes:  EOM    [x]  Normal  [] Abnormal-  Sclera  [x]  Normal  [] Abnormal -         Discharge []  None visible  [] Abnormal -    HENT:   [x] Normocephalic, atraumatic. [] Abnormal   [] Mouth/Throat: Mucous membranes are moist.     External Ears [x] Normal  [] Abnormal-     Neck: [x] No visualized mass     Pulmonary/Chest: [x] Respiratory effort normal.  [x] No visualized signs of difficulty breathing or respiratory distress        [] Abnormal-      Musculoskeletal:   [] Normal gait with no signs of ataxia         [] Normal range of motion of neck        [] Abnormal-       Neurological:        [x] No Facial Asymmetry (Cranial nerve 7 motor function) (limited exam to video visit)          [x] No gaze palsy        [] Abnormal-         Skin:        [x] No significant exanthematous lesions or discoloration noted on facial skin         [] Abnormal-            Psychiatric:       [x] Normal Affect [x] No Hallucinations        [] Abnormal-     Other pertinent observable physical exam findings-     ASSESSMENT/PLAN:  1. Anxiety  Stable on escitalopram 10 mg daily     Return in about 6 months (around 2/22/2023), or if symptoms worsen or fail to improve, for moodKy Presley, was evaluated through a synchronous (real-time) audio-video encounter. The patient (or guardian if applicable) is aware that this is a billable service, which includes applicable co-pays. This Virtual Visit was conducted with patient's (and/or legal guardian's) consent. The visit was conducted pursuant to the emergency declaration under the [facility], [facility] authority and the [employer] and [employer] General Act. Patient identification was verified, and a caregiver was present when appropriate. The patient was located at Home: [address] [place] [address]. Provider was located at [facility] (Appt Dept): [address] [place] [address]. Total time spent on this encounter:  22 minutes     --NICOL Monroe CNP on 8/22/2022 at 1:51 PM    An electronic signature was used to authenticate this note.

## 2022-11-03 DIAGNOSIS — F41.9 ANXIETY: ICD-10-CM

## 2022-11-03 RX ORDER — ESCITALOPRAM OXALATE 10 MG/1
TABLET ORAL
Qty: 90 TABLET | Refills: 1 | Status: SHIPPED | OUTPATIENT
Start: 2022-11-03

## 2022-12-29 ENCOUNTER — TELEMEDICINE (OUTPATIENT)
Dept: FAMILY MEDICINE CLINIC | Age: 22
End: 2022-12-29
Payer: COMMERCIAL

## 2022-12-29 DIAGNOSIS — J01.90 ACUTE BACTERIAL SINUSITIS: Primary | ICD-10-CM

## 2022-12-29 DIAGNOSIS — B96.89 ACUTE BACTERIAL SINUSITIS: Primary | ICD-10-CM

## 2022-12-29 PROCEDURE — 99213 OFFICE O/P EST LOW 20 MIN: CPT | Performed by: NURSE PRACTITIONER

## 2022-12-29 RX ORDER — DEXTROMETHORPHAN HYDROBROMIDE AND PROMETHAZINE HYDROCHLORIDE 15; 6.25 MG/5ML; MG/5ML
5 SYRUP ORAL 4 TIMES DAILY PRN
Qty: 140 ML | Refills: 0 | Status: SHIPPED | OUTPATIENT
Start: 2022-12-29 | End: 2023-01-05

## 2022-12-29 RX ORDER — AZITHROMYCIN 250 MG/1
TABLET, FILM COATED ORAL
Qty: 1 PACKET | Refills: 0 | Status: SHIPPED | OUTPATIENT
Start: 2022-12-29 | End: 2023-01-08

## 2022-12-29 RX ORDER — NORETHINDRONE ACETATE AND ETHINYL ESTRADIOL AND FERROUS FUMARATE 1MG-20(24)
KIT ORAL
COMMUNITY
Start: 2021-11-06

## 2022-12-29 ASSESSMENT — ENCOUNTER SYMPTOMS
SHORTNESS OF BREATH: 0
VOMITING: 0
NAUSEA: 0
ABDOMINAL PAIN: 0
COUGH: 1
RHINORRHEA: 1
SORE THROAT: 1
WHEEZING: 0

## 2022-12-29 NOTE — PROGRESS NOTES
2022    TELEHEALTH EVALUATION -- Audio/Visual ([facility])    HPI:    Ellis De Los Santos (:  2000) has requested an audio/video evaluation for the following concern(s):  Chief Complaint   Patient presents with    Cough     X1 week. Cough. Congestion. Sore throat. Sore chest from coughing. Had fever a week ago. Headache. Drainage. Itchy eye. Redness. Discharge. Patient reports that symptoms started on 22. Positive for cough, sore throat, sinus congestion, rhinorrhea, PND. Fever last Thursday of  that lasted a day. Reports that she continues with sinus congestion, rhinorrhea, PND, sore throat, cough. Did have one day with a red eye and drainage, but that is now resolved. Sinus drainage is yellow and green that is thick. Has tried dayquil, nyquil, robitussin without improvement in symptoms. LMP- on currently     Review of Systems   Constitutional:  Positive for activity change, fatigue and fever. HENT:  Positive for congestion, postnasal drip, rhinorrhea and sore throat. Respiratory:  Positive for cough. Negative for shortness of breath and wheezing. Cardiovascular:  Negative for chest pain. Gastrointestinal:  Negative for abdominal pain, nausea and vomiting. Neurological:  Positive for headaches. Negative for dizziness. Prior to Visit Medications    Medication Sig Taking?  Authorizing Provider   Norethin Ace-Eth Estrad-FE (NORETHINDRONE ACET-ETHINYL EST) 1-20 MG-MCG(24) CHEW  Yes Historical Provider, MD   escitalopram (LEXAPRO) 10 MG tablet TAKE 1 TABLET BY MOUTH ONE TIME A DAY Yes NICOL Jaime - CNP   aluminum chloride (DRYSOL) 20 % external solution APPLY TO THE HANDS AND FEET NIGHTLY  Patient not taking: Reported on 2022  Duane Quest, MD   Cholecalciferol (VITAMIN D) 50 MCG ( UT) CAPS capsule Take 1 capsule by mouth daily  Patient not taking: Reported on 2022  Historical Provider, MD   vitamin B-12 (CYANOCOBALAMIN) 500 MCG tablet Take 500 mcg by mouth daily  Patient not taking: Reported on 12/29/2022  Historical Provider, MD       Social History     Tobacco Use    Smoking status: Never    Smokeless tobacco: Never   Vaping Use    Vaping Use: Never used   Substance Use Topics    Alcohol use: No    Drug use: No        PHYSICAL EXAMINATION:  [ INSTRUCTIONS:  \"[x]\" Indicates a positive item  \"[]\" Indicates a negative item  -- DELETE ALL ITEMS NOT EXAMINED]  Patient-Reported Vitals 12/29/2022   Patient-Reported Weight 130 lb   Patient-Reported Height 5 7   Patient-Reported Temperature -        Constitutional: [x] Appears well-developed and well-nourished [x] No apparent distress      [] Abnormal-   Mental status  [x] Alert and awake  [x] Oriented to person/place/time [x]Able to follow commands      Eyes:  EOM    [x]  Normal  [] Abnormal-  Sclera  [x]  Normal  [] Abnormal -         Discharge [x]  None visible  [] Abnormal -    HENT:   [x] Normocephalic, atraumatic. [] Abnormal   [] Mouth/Throat: Mucous membranes are moist.     External Ears [x] Normal  [] Abnormal-     Neck: [x] No visualized mass     Pulmonary/Chest: [x] Respiratory effort normal.  [x] No visualized signs of difficulty breathing or respiratory distress        [] Abnormal-      Musculoskeletal:   [] Normal gait with no signs of ataxia         [] Normal range of motion of neck        [] Abnormal-       Neurological:        [x] No Facial Asymmetry (Cranial nerve 7 motor function) (limited exam to video visit)          [x] No gaze palsy        [] Abnormal-         Skin:        [] No significant exanthematous lesions or discoloration noted on facial skin         [] Abnormal-            Psychiatric:       [x] Normal Affect [] No Hallucinations        [] Abnormal-     Other pertinent observable physical exam findings-     ASSESSMENT/PLAN:  1. Acute bacterial sinusitis  - azithromycin (ZITHROMAX Z-KYREE) 250 MG tablet;  Take 2 tablets on day 1, then 1 tablet daily for the next 4 days for treatment of bacterial infection. Dispense: 1 packet; Refill: 0  Increase fluids  Rest  Normal saline spray PRN   Humidifier qhs  Mucinex BID PRN  Can take promethazine-DM qhs PRN to help with cough  Patient is to call if symptoms worsen or fail to improve within the week. Work excuse provided. Return if symptoms worsen or fail to improve. Saji Kalani, was evaluated through a synchronous (real-time) audio-video encounter. The patient (or guardian if applicable) is aware that this is a billable service, which includes applicable co-pays. This Virtual Visit was conducted with patient's (and/or legal guardian's) consent. The visit was conducted pursuant to the emergency declaration under the [address] [place] [address] authority and the [employer] and [employer] General Act. Patient identification was verified, and a caregiver was present when appropriate. The patient was located at Home: [address] [place] [address]. Provider was located at [facility] (Appt Dept): [address] [place] [address]. Total time spent on this encounter: Not billed by time    --NICOL Muhammad CNP on 12/29/2022 at 9:21 AM    An electronic signature was used to authenticate this note.

## 2022-12-29 NOTE — LETTER
[address] [facility] [address] [facility]  Phone: [phone]  Fax: [phone]    Oval Schlatter, APRN - CNP        December 29, 2022     Patient: Agnes Valerio   YOB: 2000   Date of Visit: 12/29/2022       To Whom it May Concern:    Agnes Valerio was seen on 12/29/2022. She may return to work on 1/1/23. If you have any questions or concerns, please don't hesitate to call.     Sincerely,     Oval Schlatter, APRN - CNP

## 2023-01-21 ENCOUNTER — NURSE TRIAGE (OUTPATIENT)
Dept: OTHER | Facility: CLINIC | Age: 23
End: 2023-01-21

## 2023-01-21 NOTE — TELEPHONE ENCOUNTER
Location of patient: [place] [address] triage as patient is not with caller. Subjective: Caller, patient's mother,  states \"My daugher is at college and pretty sick. I don't see an urgent care within 20 miles of her. Her temp is 102.7F. She is dizzy and overall doen't feel well. She doesn't have any COVID tests or anything that she can take. \"     Current Symptoms: fever    Onset: 2 day ago; gradual    Associated Symptoms:  dizziness, sore throat, body aches, headache    Pain Severity: unable to provide /10; ;     Temperature: 102.7F     What has been tried: tylenol    LMP: NA Pregnant: NA    Recommended disposition: See HCP within 4 Hours (or PCP triage)    Care advice provided, patient verbalizes understanding; denies any other questions or concerns; instructed to call back for any new or worsening symptoms. Patient/caller agrees to proceed to nearest [facility] or ED of none available in her area. Attention Provider: Thank you for allowing me to participate in the care of your patient. The patient was connected to triage in response to symptoms provided. Please do not respond through this encounter as the response is not directed to a shared pool.     Reason for Disposition   Severe chills (i.e., feeling extremely cold WITH shaking chills)    Protocols used: Fever-ADULT-AH

## 2023-05-22 ENCOUNTER — NURSE TRIAGE (OUTPATIENT)
Dept: OTHER | Facility: CLINIC | Age: 23
End: 2023-05-22

## 2023-05-22 NOTE — TELEPHONE ENCOUNTER
Location of patient: [place]    Subjective: Caller states \"I am a college student\"     Current Symptoms: Sore throat and cough. No pus. Tonsils are swollen. Still breathing and swallowing without difficulty. Onset: 4 days ago; worsening    Associated Symptoms: reduced activity    Pain Severity: 6-7/10; ; constant    Temperature: denies fever     LMP: NA Pregnant: No    Recommended disposition: See in Office Today    Care advice provided, patient verbalizes understanding; denies any other questions or concerns; instructed to call back for any new or worsening symptoms. Patient/caller agrees to proceed to nearest [facility]     Attention Provider: Thank you for allowing me to participate in the care of your patient. The patient was connected to triage in response to symptoms provided. Please do not respond through this encounter as the response is not directed to a shared pool.     Reason for Disposition   Patient wants to be seen    Protocols used: Sore Throat-ADULT-[place]

## 2023-05-22 NOTE — TELEPHONE ENCOUNTER
Please f/u with patient and make sure she goes to urgent care for evaluation. I believe she is still at school. If she is home from school, she can come into the office today for evaluation.

## 2023-07-25 DIAGNOSIS — F41.9 ANXIETY: ICD-10-CM

## 2023-07-26 RX ORDER — ESCITALOPRAM OXALATE 10 MG/1
TABLET ORAL
Qty: 90 TABLET | Refills: 1 | Status: SHIPPED | OUTPATIENT
Start: 2023-07-26